# Patient Record
Sex: FEMALE | Race: WHITE | Employment: FULL TIME | URBAN - METROPOLITAN AREA
[De-identification: names, ages, dates, MRNs, and addresses within clinical notes are randomized per-mention and may not be internally consistent; named-entity substitution may affect disease eponyms.]

---

## 2021-08-24 ENCOUNTER — HOSPITAL ENCOUNTER (EMERGENCY)
Age: 36
Discharge: HOME | End: 2021-08-24
Attending: EMERGENCY MEDICINE

## 2021-08-24 VITALS
SYSTOLIC BLOOD PRESSURE: 114 MMHG | BODY MASS INDEX: 23.16 KG/M2 | TEMPERATURE: 98.2 F | OXYGEN SATURATION: 100 % | RESPIRATION RATE: 14 BRPM | HEART RATE: 73 BPM | WEIGHT: 139 LBS | DIASTOLIC BLOOD PRESSURE: 68 MMHG | HEIGHT: 65 IN

## 2021-08-24 DIAGNOSIS — K04.7 DENTAL INFECTION: ICD-10-CM

## 2021-08-24 DIAGNOSIS — K08.89 PAIN, DENTAL: Primary | ICD-10-CM

## 2021-08-24 PROCEDURE — 6370000000 HC RX 637 (ALT 250 FOR IP): Performed by: PHYSICIAN ASSISTANT

## 2021-08-24 PROCEDURE — 99283 EMERGENCY DEPT VISIT LOW MDM: CPT

## 2021-08-24 RX ORDER — OXYCODONE HYDROCHLORIDE AND ACETAMINOPHEN 5; 325 MG/1; MG/1
1 TABLET ORAL ONCE
Status: COMPLETED | OUTPATIENT
Start: 2021-08-24 | End: 2021-08-24

## 2021-08-24 RX ORDER — OXYCODONE HYDROCHLORIDE AND ACETAMINOPHEN 5; 325 MG/1; MG/1
1 TABLET ORAL EVERY 6 HOURS PRN
Qty: 12 TABLET | Refills: 0 | Status: SHIPPED | OUTPATIENT
Start: 2021-08-24 | End: 2021-08-27

## 2021-08-24 RX ORDER — PENICILLIN V POTASSIUM 250 MG/1
500 TABLET ORAL ONCE
Status: COMPLETED | OUTPATIENT
Start: 2021-08-24 | End: 2021-08-24

## 2021-08-24 RX ORDER — PENICILLIN V POTASSIUM 500 MG/1
500 TABLET ORAL 4 TIMES DAILY
Qty: 28 TABLET | Refills: 0 | Status: SHIPPED | OUTPATIENT
Start: 2021-08-24 | End: 2021-08-31

## 2021-08-24 RX ADMIN — PENICILLIN V POTASSIUM 500 MG: 250 TABLET ORAL at 11:54

## 2021-08-24 RX ADMIN — OXYCODONE HYDROCHLORIDE AND ACETAMINOPHEN 1 TABLET: 5; 325 TABLET ORAL at 11:55

## 2021-08-24 ASSESSMENT — PAIN DESCRIPTION - PAIN TYPE: TYPE: ACUTE PAIN

## 2021-08-24 ASSESSMENT — PAIN SCALES - GENERAL
PAINLEVEL_OUTOF10: 6

## 2021-08-24 ASSESSMENT — PAIN DESCRIPTION - LOCATION: LOCATION: TEETH

## 2021-08-24 ASSESSMENT — PAIN DESCRIPTION - ORIENTATION: ORIENTATION: RIGHT;LOWER;LEFT

## 2021-08-24 NOTE — ED NOTES
Pt not driving, significant other driving. Pt broke bottom left molar last night.   No other c/o     Lina Ellis RN  08/24/21 9099

## 2021-08-24 NOTE — ED PROVIDER NOTES
[address] [facility] ED  [address] [facility] [address]  Phone: [phone]  Fax: [phone] [facility]      Pt Name: Clementine Adair  MRN: [id number]  [place] 1985  Date of evaluation: 2021  Provider: Morenita Grace PA-C    CHIEF COMPLAINT       Chief Complaint   Patient presents with    Dental Pain     BOTTOM LEFT SIDE BROKEN TOOTH           HISTORY OF PRESENT ILLNESS  (Location/Symptom, Timing/Onset, Context/Setting, Quality, Duration, Modifying Factors, Severity.)   Clementine Adair is a 39 y.o. female who presents to the emergency department for evaluation of DENTAL PAIN. Context/timeframe:  Pt here at request of [facility] medical staff for evaluation and treatment for possible dental abscess after recent dental fracture. Pt is due to [employer] at [employer] for deployment to [place], [place]. They requested antibiotics and pain meds for very long flight and expected pain due to pressure change. No f/c/n/v. Pt fractured around old filling a few days ago. No HA or neck pain. Known caries and/or fractures? YES   Facial swelling? NO  Fever? NO  Vomiting? NO  Neck pain? NO      Nursing Notes were reviewed. REVIEW OF SYSTEMS    (2-9 systems for level 4, 10 or more for level 5)     Review of Systems   Constitutional: Negative. HENT:  Per HPI  Eyes: Negative. Respiratory: Negative. Cardiovascular: Negative. Gastrointestinal: Negative. Musculoskeletal: Negative. Neck: Per HPI  Endocrine: Negative. Genitourinary: Negative. Skin: Negative. Allergic/Immunologic: Negative. Neurological: Negative. Hematological: Negative. Psychiatric/Behavioral: Negative. Except as noted above the remainder of the review of systems was reviewed and negative. PAST MEDICAL HISTORY   History reviewed. No pertinent past medical history.       SURGICAL HISTORY           Procedure Laterality Date     SECTION      HYSTERECTOMY were within normal range or not returned as of this dictation. EMERGENCY DEPARTMENT COURSE and DIFFERENTIAL DIAGNOSIS/MDM:   Vitals:    Vitals:    08/24/21 1117   BP: 114/68   Pulse: 73   Resp: 14   Temp: 98.2 °F (36.8 °C)   TempSrc: Oral   SpO2: 100%   Weight: 63 kg (139 lb)   Height: 5' 5\" (1.651 m)       1130  PCN and Percocet ordered for here and for deployment transport to [place]. No drainage abscess or significant signs of infectious process. AVSS. Needed evaluation and then meds for 30 hr flights to [place], [place]. CONSULTS:  None    PROCEDURES:  None    I have reviewed the disposition diagnosis with the patient and or their family/guardian. I have answered their questions and given discharge instructions. They voiced understanding of these instructions and did not have any further questions or complaints. FINAL IMPRESSION      1. Pain, dental    2. Dental infection          DISPOSITION/PLAN   DISPOSITION Decision To Discharge    PATIENT REFERRED TO:  [facility] ED  [address] [place] [address]  [phone]  Go to   for worsening of symptoms      DISCHARGE MEDICATIONS:  Discharge Medication List as of 8/24/2021 11:49 AM      START taking these medications    Details   oxyCODONE-acetaminophen (PERCOCET) 5-325 MG per tablet Take 1 tablet by mouth every 6 hours as needed for Pain for up to 3 days. Intended supply: 3 days.  Take lowest dose possible to manage pain, Disp-12 tablet, R-0Print      penicillin v potassium (VEETID) 500 MG tablet Take 1 tablet by mouth 4 times daily for 7 days, Disp-28 tablet, R-0Print             (Please note that portions of this note were completed with a voice recognition program.  Efforts were made to edit the dictations but occasionally words are mis-transcribed.)    GUERLINE Pena PA-C  08/24/21 8026

## 2021-08-24 NOTE — ED PROVIDER NOTES
[address] [facility] ED  [address] [facility] [address]  Phone: [phone]  Fax: [phone]      Attending Physician [address]       Chief Complaint   Patient presents with    Dental Pain     BOTTOM LEFT SIDE BROKEN TOOTH       DIAGNOSTIC RESULTS     LABS:  Labs Reviewed - No data to display    All other labs were within normal range or not returned as of this dictation. RADIOLOGY:  No orders to display         EMERGENCY DEPARTMENT COURSE:   Vitals:    Vitals:    08/24/21 1117   BP: 114/68   Pulse: 73   Resp: 14   Temp: 98.2 °F (36.8 °C)   TempSrc: Oral   SpO2: 100%   Weight: 63 kg (139 lb)   Height: 5' 5\" (1.651 m)     -------------------------  BP: 114/68, Temp: 98.2 °F (36.8 °C), Pulse: 73, Resp: 14             PERTINENT ATTENDING PHYSICIAN COMMENTS:    I performed a history and physical examination of the patient and discussed management with the mid level provider. I reviewed the mid level provider's note and agree with the documented findings and plan of care. Any areas of disagreement are noted on the chart. I was personally present for the key portions of any procedures. I have documented in the chart those procedures where I was not present during the key portions. I have reviewed the emergency nurses triage note. I agree with the chief complaint, past medical history, past surgical history, allergies, medications, social and family history as documented unless otherwise noted below. Documentation of the HPI, Physical Exam and Medical Decision Making performed by mid level providers is based on my personal performance of the HPI, PE and MDM. For Physician Assistant/ Nurse Practitioner cases/documentation I have personally evaluated this patient and have completed at least one if not all key elements of the E/M (history, physical exam, and MDM). Additional findings are as noted. Left lower dental fracture due to decay and a large filling.  No sign of abscess. Plan for oral antibiotics. 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        (Please note that portions of this note were completed with a voice recognition program.  Efforts were made to edit the dictations but occasionally words are mis-transcribed.)    Bebo Joseph MD  Attending Emergency Medicine Physician        Bebo Joseph MD  08/24/21 4589